# Patient Record
Sex: MALE | Race: WHITE | NOT HISPANIC OR LATINO
[De-identification: names, ages, dates, MRNs, and addresses within clinical notes are randomized per-mention and may not be internally consistent; named-entity substitution may affect disease eponyms.]

---

## 2017-08-07 PROBLEM — Z00.00 ENCOUNTER FOR PREVENTIVE HEALTH EXAMINATION: Status: ACTIVE | Noted: 2017-08-07

## 2017-08-29 ENCOUNTER — RECORD ABSTRACTING (OUTPATIENT)
Age: 82
End: 2017-08-29

## 2017-08-29 DIAGNOSIS — E78.5 HYPERLIPIDEMIA, UNSPECIFIED: ICD-10-CM

## 2017-08-29 DIAGNOSIS — I48.91 UNSPECIFIED ATRIAL FIBRILLATION: ICD-10-CM

## 2017-08-29 DIAGNOSIS — Z92.89 PERSONAL HISTORY OF OTHER MEDICAL TREATMENT: ICD-10-CM

## 2017-08-29 RX ORDER — WARFARIN SODIUM 5 MG/1
5 TABLET ORAL
Refills: 0 | Status: ACTIVE | COMMUNITY

## 2017-08-29 RX ORDER — ATORVASTATIN CALCIUM 40 MG/1
40 TABLET, FILM COATED ORAL
Refills: 0 | Status: ACTIVE | COMMUNITY

## 2017-08-29 RX ORDER — ASPIRIN 81 MG/1
81 TABLET ORAL
Refills: 0 | Status: ACTIVE | COMMUNITY

## 2017-08-29 RX ORDER — SIMVASTATIN 10 MG/1
10 TABLET, FILM COATED ORAL DAILY
Refills: 0 | Status: ACTIVE | COMMUNITY

## 2017-08-29 RX ORDER — ASPIRIN 81 MG
81 TABLET, DELAYED RELEASE (ENTERIC COATED) ORAL
Refills: 0 | Status: ACTIVE | COMMUNITY

## 2017-08-29 RX ORDER — ISOSORBIDE MONONITRATE 30 MG
30 TABLET, EXTENDED RELEASE 24 HR ORAL
Refills: 0 | Status: ACTIVE | COMMUNITY

## 2017-08-29 RX ORDER — ISOSORBIDE MONONITRATE 60 MG/1
60 TABLET, EXTENDED RELEASE ORAL
Refills: 0 | Status: ACTIVE | COMMUNITY

## 2017-08-29 RX ORDER — METOPROLOL SUCCINATE 50 MG/1
50 TABLET, EXTENDED RELEASE ORAL
Refills: 0 | Status: ACTIVE | COMMUNITY

## 2017-09-12 ENCOUNTER — APPOINTMENT (OUTPATIENT)
Dept: NEUROLOGY | Facility: CLINIC | Age: 82
End: 2017-09-12
Payer: MEDICARE

## 2017-09-12 VITALS
BODY MASS INDEX: 24.11 KG/M2 | DIASTOLIC BLOOD PRESSURE: 64 MMHG | WEIGHT: 150 LBS | HEIGHT: 66 IN | SYSTOLIC BLOOD PRESSURE: 123 MMHG

## 2017-09-12 PROCEDURE — 99213 OFFICE O/P EST LOW 20 MIN: CPT

## 2017-12-12 ENCOUNTER — APPOINTMENT (OUTPATIENT)
Dept: NEUROLOGY | Facility: CLINIC | Age: 82
End: 2017-12-12
Payer: MEDICARE

## 2017-12-12 VITALS
HEIGHT: 66 IN | SYSTOLIC BLOOD PRESSURE: 110 MMHG | DIASTOLIC BLOOD PRESSURE: 70 MMHG | WEIGHT: 150 LBS | BODY MASS INDEX: 24.11 KG/M2

## 2017-12-12 PROCEDURE — 99214 OFFICE O/P EST MOD 30 MIN: CPT

## 2017-12-12 RX ORDER — RASAGILINE MESYLATE 1 MG/1
1 TABLET ORAL DAILY
Refills: 0 | Status: COMPLETED | COMMUNITY
End: 2017-12-12

## 2018-03-26 ENCOUNTER — APPOINTMENT (OUTPATIENT)
Dept: NEUROLOGY | Facility: CLINIC | Age: 83
End: 2018-03-26
Payer: MEDICARE

## 2018-03-26 VITALS
SYSTOLIC BLOOD PRESSURE: 115 MMHG | WEIGHT: 150 LBS | BODY MASS INDEX: 24.11 KG/M2 | HEIGHT: 66 IN | DIASTOLIC BLOOD PRESSURE: 63 MMHG

## 2018-03-26 PROCEDURE — 99214 OFFICE O/P EST MOD 30 MIN: CPT

## 2018-06-25 ENCOUNTER — APPOINTMENT (OUTPATIENT)
Dept: NEUROLOGY | Facility: CLINIC | Age: 83
End: 2018-06-25
Payer: MEDICARE

## 2018-06-25 VITALS
BODY MASS INDEX: 24.11 KG/M2 | SYSTOLIC BLOOD PRESSURE: 130 MMHG | WEIGHT: 150 LBS | DIASTOLIC BLOOD PRESSURE: 80 MMHG | HEIGHT: 66 IN

## 2018-06-25 PROCEDURE — 99214 OFFICE O/P EST MOD 30 MIN: CPT

## 2018-10-22 ENCOUNTER — APPOINTMENT (OUTPATIENT)
Dept: NEUROLOGY | Facility: CLINIC | Age: 83
End: 2018-10-22
Payer: MEDICARE

## 2018-10-22 VITALS
DIASTOLIC BLOOD PRESSURE: 68 MMHG | SYSTOLIC BLOOD PRESSURE: 118 MMHG | WEIGHT: 150 LBS | BODY MASS INDEX: 24.11 KG/M2 | HEIGHT: 66 IN

## 2018-10-22 PROCEDURE — 99214 OFFICE O/P EST MOD 30 MIN: CPT

## 2019-02-25 ENCOUNTER — APPOINTMENT (OUTPATIENT)
Dept: NEUROLOGY | Facility: CLINIC | Age: 84
End: 2019-02-25
Payer: MEDICARE

## 2019-02-25 VITALS
DIASTOLIC BLOOD PRESSURE: 68 MMHG | SYSTOLIC BLOOD PRESSURE: 108 MMHG | WEIGHT: 150 LBS | HEIGHT: 66 IN | BODY MASS INDEX: 24.11 KG/M2

## 2019-02-25 PROCEDURE — 99214 OFFICE O/P EST MOD 30 MIN: CPT

## 2019-02-25 NOTE — CONSULT LETTER
[Dear  ___] : Dear  [unfilled], [Courtesy Letter:] : I had the pleasure of seeing your patient, [unfilled], in my office today. [Please see my note below.] : Please see my note below. [Consult Closing:] : Thank you very much for allowing me to participate in the care of this patient.  If you have any questions, please do not hesitate to contact me. [Sincerely,] : Sincerely, [FreeTextEntry3] : Bolivar Mathis M.D., Ph.D. DPN-N\par [facility]\par Neurology at [place]\par Medical Director of Stroke Services\par [facility]\par

## 2019-02-25 NOTE — PHYSICAL EXAM
[Person] : oriented to person [Place] : oriented to place [Time] : oriented to time [Remote Intact] : remote memory intact [Registration Intact] : recent registration memory intact [Span Intact] : the attention span was normal [Concentration Intact] : normal concentrating ability [Visual Intact] : visual attention was ~T not ~L decreased [Naming Objects] : no difficulty naming common objects [Repeating Phrases] : no difficulty repeating a phrase [Fluency] : fluency intact [Comprehension] : comprehension intact [Current Events] : adequate knowledge of current events [Past History] : adequate knowledge of personal past history [Cranial Nerves Optic (II)] : visual acuity intact bilaterally,  visual fields full to confrontation, pupils equal round and reactive to light [Cranial Nerves Oculomotor (III)] : extraocular motion intact [Cranial Nerves Trigeminal (V)] : facial sensation intact symmetrically [Cranial Nerves Facial (VII)] : face symmetrical [Cranial Nerves Vestibulocochlear (VIII)] : hearing was intact bilaterally [Cranial Nerves Glossopharyngeal (IX)] : tongue and palate midline [Cranial Nerves Accessory (XI - Cranial And Spinal)] : head turning and shoulder shrug symmetric [Cranial Nerves Hypoglossal (XII)] : there was no tongue deviation with protrusion [No Muscle Atrophy] : normal bulk in all four extremities [Paresis Pronator Drift Right-Sided] : no pronator drift on the right [Paresis Pronator Drift Left-Sided] : no pronator drift on the left [Sensation Tactile Decrease] : light touch was intact [Sensation Pain / Temperature Decrease] : pain and temperature was intact [Sensation Vibration Decrease] : vibration was intact [Proprioception] : proprioception was intact [Tremor] : a tremor present [Coordination - Dysmetria Impaired Finger-to-Nose Bilateral] : not present [1+] : Patella left 1+ [Plantar Reflex Right Only] : normal on the right [Plantar Reflex Left Only] : normal on the left [FreeTextEntry6] : Increased tone bilateral, diffuse weakness [FreeTextEntry8] : using walker, stooped posture, shuffling gait [Sclera] : the sclera and conjunctiva were normal [PERRL With Normal Accommodation] : pupils were equal in size, round, reactive to light, with normal accommodation [Extraocular Movements] : extraocular movements were intact [No APD] : no afferent pupillary defect [No DELFINA] : no internuclear ophthalmoplegia [Full Visual Field] : full visual field

## 2019-02-25 NOTE — ASSESSMENT
[FreeTextEntry1] : This is an 88-year-old man who follows up today with Parkinson's disease as well as seizure disorder.\par \par Parkinson's disease: he should continue Sinemet 25/100 mg tablets one tablet 4 times a day, entacapone 100 mg 3 times a day, ropinirole 1 mg twice a day.\par \par Seizure disorder: He should continue carbamazepine 200 mg twice a day.\par \par Back pain: this is likely musculoskeletal. I offered physical therapy but he declined. A suggested pain management which he didn't past not find helpful. He I also advised heating packs.\par \par I will see him back in 6 months, sooner should the need arise. I have asked him to call me in the interim with any problems, questions or concerns.\par

## 2019-02-25 NOTE — HISTORY OF PRESENT ILLNESS
[FreeTextEntry1] : Followup visit September 12, 2017:\par This is an 86-year-old gentleman with idiopathic Parkinson's disease returns today for followup. He is maintained on Sinemet 25/100 mg tablets 4 times a day in epidural. He had been on as left but stopped when we had to restart his Tegretol. Since stopping the azilect he suffered a decline. He feels that he is more stopping and starting/on-off episodes. Regarding his seizures he has not had a recurrent seizure since being restarted on Tegretol. He is here today for neurologic followup.\par \par Followup December 12, 2017:\par This is an 86-year-old donor dressing for Parkinson's disease. He continues to take Sinemet 25/100 mg tablets 4 times a day. We had started him on Comtan he was getting too tired from it any tapered down to 1-1/2 tablets per day. At this dose he is able tolerate it. He is on Tegretol for seizures and doing stable. He is overall stable since his last visit in September.\par \par Followup March 26, 2018:\par This is an 87-year-old man who returns today for followup of idiopathic Parkinson's disease and seizure disorder. He is here with his wife who states that he's had some mild decline since being seen 3 months ago. He is taking Sinemet 4 times a day, ropinirole twice a day and entacapone twice a day for his Parkinson's disease. He is also taking Tegretol for seizure. He here today for neurologic followup.\par \par Followup June 25, 2018:\par This is an 87-year-old man who returns today for followup. He has idiopathic Parkinson's disease as well as seizure disorder. He is maintained at on Sinemet 25/100 mg tablets one tablet 4 times a day, entacapone 200 mg tablets one tablet in the morning and half a tablet in the evening and ropinirole twice daily. His seizures  are maintained on carbamazepine. He is taking his medications without side effects and with good compliance. He is shuffling a bit more and has increased tremor but is otherwise stable. He has not had any seizures. He is here today with his wife routine neurologic followup.\par \par Followup October 22, 2018:\par This is an 87-year-old man who presents today for followup of Parkinson's disease and seizure disorder. He is accompanied by his wife. For his Parkinson's disease he is currently taking Sinemet 4 times a day along with Comtan 200 Mg in the Morning and 100 Mg at Night and Requip twice daily. He is noting increasing amount of off time. He complains that he is stiff when walking. Regarding his seizure she is currently on carbamazepine and has not had breakthrough seizure. He has excellent compliance with all of his medications. He is here today for neurologic followup.\par \par Followup February 25, 2019:\par This is an 88-year-old man who presents today for followup of Parkinson's disease and seizure disorder. He is currently taking Sinemet 25/100 mg tablets one tablet 4 times a day, Comtan 100 mg 3 times a day, ropinirole 1 mg twice a day, and carbamazepine 200 mg twice a day. He has suffered functional decline since his last visit. He is also having a significant amount of back pain and he has a hunched over posture. He's experiencing pain in his legs too. Regarding his Parkinson's he continues to complain of on-off episodes and freezing. She had been at [facility] in the past but was not able to tolerate it. He is here today for neurologic followup.

## 2019-08-26 ENCOUNTER — APPOINTMENT (OUTPATIENT)
Dept: NEUROLOGY | Facility: CLINIC | Age: 84
End: 2019-08-26
Payer: MEDICARE

## 2019-08-26 VITALS
SYSTOLIC BLOOD PRESSURE: 115 MMHG | HEIGHT: 66 IN | BODY MASS INDEX: 24.11 KG/M2 | WEIGHT: 150 LBS | DIASTOLIC BLOOD PRESSURE: 80 MMHG

## 2019-08-26 PROCEDURE — 99213 OFFICE O/P EST LOW 20 MIN: CPT

## 2019-08-26 NOTE — CONSULT LETTER
[Courtesy Letter:] : I had the pleasure of seeing your patient, [unfilled], in my office today. [Dear  ___] : Dear  [unfilled], [Please see my note below.] : Please see my note below. [Consult Closing:] : Thank you very much for allowing me to participate in the care of this patient.  If you have any questions, please do not hesitate to contact me. [Sincerely,] : Sincerely, [FreeTextEntry3] : Bolivar Mathis M.D., Ph.D. DPN-N\par [facility]\par Neurology at [place]\par Medical Director of Stroke Services\par [facility]\par

## 2019-08-26 NOTE — HISTORY OF PRESENT ILLNESS
[FreeTextEntry1] : Followup visit September 12, 2017:\par This is an 86-year-old gentleman with idiopathic Parkinson's disease returns today for followup. He is maintained on Sinemet 25/100 mg tablets 4 times a day in epidural. He had been on as left but stopped when we had to restart his Tegretol. Since stopping the azilect he suffered a decline. He feels that he is more stopping and starting/on-off episodes. Regarding his seizures he has not had a recurrent seizure since being restarted on Tegretol. He is here today for neurologic followup.\par \par Followup December 12, 2017:\par This is an 86-year-old donor dressing for Parkinson's disease. He continues to take Sinemet 25/100 mg tablets 4 times a day. We had started him on Comtan he was getting too tired from it any tapered down to 1-1/2 tablets per day. At this dose he is able tolerate it. He is on Tegretol for seizures and doing stable. He is overall stable since his last visit in September.\par \par Followup March 26, 2018:\par This is an 87-year-old man who returns today for followup of idiopathic Parkinson's disease and seizure disorder. He is here with his wife who states that he's had some mild decline since being seen 3 months ago. He is taking Sinemet 4 times a day, ropinirole twice a day and entacapone twice a day for his Parkinson's disease. He is also taking Tegretol for seizure. He here today for neurologic followup.\par \par Followup June 25, 2018:\par This is an 87-year-old man who returns today for followup. He has idiopathic Parkinson's disease as well as seizure disorder. He is maintained at on Sinemet 25/100 mg tablets one tablet 4 times a day, entacapone 200 mg tablets one tablet in the morning and half a tablet in the evening and ropinirole twice daily. His seizures  are maintained on carbamazepine. He is taking his medications without side effects and with good compliance. He is shuffling a bit more and has increased tremor but is otherwise stable. He has not had any seizures. He is here today with his wife routine neurologic followup.\par \par Followup October 22, 2018:\par This is an 87-year-old man who presents today for followup of Parkinson's disease and seizure disorder. He is accompanied by his wife. For his Parkinson's disease he is currently taking Sinemet 4 times a day along with Comtan 200 Mg in the Morning and 100 Mg at Night and Requip twice daily. He is noting increasing amount of off time. He complains that he is stiff when walking. Regarding his seizure she is currently on carbamazepine and has not had breakthrough seizure. He has excellent compliance with all of his medications. He is here today for neurologic followup.\par \par Followup February 25, 2019:\par This is an 88-year-old man who presents today for followup of Parkinson's disease and seizure disorder. He is currently taking Sinemet 25/100 mg tablets one tablet 4 times a day, Comtan 100 mg 3 times a day, ropinirole 1 mg twice a day, and carbamazepine 200 mg twice a day. He has suffered functional decline since his last visit. He is also having a significant amount of back pain and he has a hunched over posture. He's experiencing pain in his legs too. Regarding his Parkinson's he continues to complain of on-off episodes and freezing. She had been [facility] in the past but was not able to tolerate it. He is here today for neurologic followup.\par \par Followup August 26, 2019:\par This is a 88-year-old man who presents today for followup of Parkinson's disease and seizure. Since his last visit he was hospitalized with what sounds like a GI bleed. He went for rehabilitation following his hospitalization and had home physical therapy as well. His wife states that he is not back to his baseline prior to admission. His legs appear to be weaker and having more trouble walking. He continues to have parkinsonian tremor. He's not had any seizures. For his Parkinson's disease he remains on Sinemet, entacapone, and ropinirole. For his seizure disorder he remains on carbamazepine. He is here today for neurologic followup.

## 2019-08-26 NOTE — PHYSICAL EXAM
[Person] : oriented to person [Time] : oriented to time [Place] : oriented to place [Registration Intact] : recent registration memory intact [Remote Intact] : remote memory intact [Concentration Intact] : normal concentrating ability [Span Intact] : the attention span was normal [Naming Objects] : no difficulty naming common objects [Visual Intact] : visual attention was ~T not ~L decreased [Repeating Phrases] : no difficulty repeating a phrase [Fluency] : fluency intact [Comprehension] : comprehension intact [Past History] : adequate knowledge of personal past history [Current Events] : adequate knowledge of current events [Cranial Nerves Optic (II)] : visual acuity intact bilaterally,  visual fields full to confrontation, pupils equal round and reactive to light [Cranial Nerves Oculomotor (III)] : extraocular motion intact [Cranial Nerves Facial (VII)] : face symmetrical [Cranial Nerves Trigeminal (V)] : facial sensation intact symmetrically [Cranial Nerves Glossopharyngeal (IX)] : tongue and palate midline [Cranial Nerves Vestibulocochlear (VIII)] : hearing was intact bilaterally [Cranial Nerves Accessory (XI - Cranial And Spinal)] : head turning and shoulder shrug symmetric [Cranial Nerves Hypoglossal (XII)] : there was no tongue deviation with protrusion [No Muscle Atrophy] : normal bulk in all four extremities [Paresis Pronator Drift Left-Sided] : no pronator drift on the left [Paresis Pronator Drift Right-Sided] : no pronator drift on the right [Sensation Tactile Decrease] : light touch was intact [Sensation Vibration Decrease] : vibration was intact [Sensation Pain / Temperature Decrease] : pain and temperature was intact [Proprioception] : proprioception was intact [Tremor] : a tremor present [Coordination - Dysmetria Impaired Finger-to-Nose Bilateral] : not present [1+] : Patella left 1+ [Plantar Reflex Right Only] : normal on the right [Plantar Reflex Left Only] : normal on the left [FreeTextEntry6] : Increased tone bilateral, diffuse weakness [FreeTextEntry8] : using walker, stooped posture, shuffling gait [Sclera] : the sclera and conjunctiva were normal [Extraocular Movements] : extraocular movements were intact [PERRL With Normal Accommodation] : pupils were equal in size, round, reactive to light, with normal accommodation [No APD] : no afferent pupillary defect [No DELFINA] : no internuclear ophthalmoplegia [Full Visual Field] : full visual field

## 2019-08-26 NOTE — ASSESSMENT
[FreeTextEntry1] : This is a 88-year-old man with a seizure disorder Parkinson's disease.\par \par Seizure disorder: Continue carbamazepine 200 mg twice daily.\par \par Parkinson's disease: Continue Sinemet, entacapone and repair all. I offered physical therapy to continue at home to help with his gait which he declined.\par \par I will see him back in the office in 6 months, sooner should any arise. I have asked his wife to call me in the interim should she have any problems questions or concerns before his next appointment.

## 2020-02-24 ENCOUNTER — APPOINTMENT (OUTPATIENT)
Dept: NEUROLOGY | Facility: CLINIC | Age: 85
End: 2020-02-24
Payer: MEDICARE

## 2020-02-24 VITALS
DIASTOLIC BLOOD PRESSURE: 70 MMHG | HEIGHT: 66 IN | WEIGHT: 145 LBS | SYSTOLIC BLOOD PRESSURE: 118 MMHG | BODY MASS INDEX: 23.3 KG/M2

## 2020-02-24 DIAGNOSIS — R56.9 UNSPECIFIED CONVULSIONS: ICD-10-CM

## 2020-02-24 DIAGNOSIS — G20 PARKINSON'S DISEASE: ICD-10-CM

## 2020-02-24 PROCEDURE — 99213 OFFICE O/P EST LOW 20 MIN: CPT

## 2020-02-24 RX ORDER — CARBAMAZEPINE 200 MG/1
200 TABLET ORAL
Refills: 0 | Status: ACTIVE | COMMUNITY

## 2020-02-24 RX ORDER — ENTACAPONE 200 MG/1
200 TABLET, FILM COATED ORAL 3 TIMES DAILY
Qty: 90 | Refills: 1 | Status: ACTIVE | COMMUNITY
Start: 2017-09-12

## 2020-02-24 RX ORDER — CARBIDOPA AND LEVODOPA 25; 100 MG/1; MG/1
25-100 TABLET ORAL
Refills: 0 | Status: ACTIVE | COMMUNITY

## 2020-02-24 NOTE — ASSESSMENT
[FreeTextEntry1] : This is a 89-year-old man with Parkinson's disease. He is progressing and having more difficulty walking. He continues to go for physical therapy. I have asked his wife to increase his ropinirole to 2 mg twice a day. The past and has been decreased to 1 mg twice a day secondary to hallucination. If he has more hallucinations on higher dose he'll go back down to 1 mg twice a day. If he does agree to physical therapy as always it offered to write him a prescription for it.

## 2020-02-24 NOTE — PHYSICAL EXAM
[Person] : oriented to person [Place] : oriented to place [Remote Intact] : remote memory intact [Time] : oriented to time [Registration Intact] : recent registration memory intact [Span Intact] : the attention span was normal [Visual Intact] : visual attention was ~T not ~L decreased [Concentration Intact] : normal concentrating ability [Naming Objects] : no difficulty naming common objects [Fluency] : fluency intact [Repeating Phrases] : no difficulty repeating a phrase [Comprehension] : comprehension intact [Current Events] : adequate knowledge of current events [Past History] : adequate knowledge of personal past history [Cranial Nerves Optic (II)] : visual acuity intact bilaterally,  visual fields full to confrontation, pupils equal round and reactive to light [Cranial Nerves Oculomotor (III)] : extraocular motion intact [Cranial Nerves Trigeminal (V)] : facial sensation intact symmetrically [Cranial Nerves Vestibulocochlear (VIII)] : hearing was intact bilaterally [Cranial Nerves Facial (VII)] : face symmetrical [Cranial Nerves Accessory (XI - Cranial And Spinal)] : head turning and shoulder shrug symmetric [Cranial Nerves Glossopharyngeal (IX)] : tongue and palate midline [Cranial Nerves Hypoglossal (XII)] : there was no tongue deviation with protrusion [No Muscle Atrophy] : normal bulk in all four extremities [Paresis Pronator Drift Right-Sided] : no pronator drift on the right [Paresis Pronator Drift Left-Sided] : no pronator drift on the left [Sensation Pain / Temperature Decrease] : pain and temperature was intact [Sensation Tactile Decrease] : light touch was intact [Tremor] : a tremor present [Sensation Vibration Decrease] : vibration was intact [Proprioception] : proprioception was intact [Coordination - Dysmetria Impaired Finger-to-Nose Bilateral] : not present [1+] : Patella left 1+ [Plantar Reflex Right Only] : normal on the right [Plantar Reflex Left Only] : normal on the left [FreeTextEntry6] : Increased tone bilateral, diffuse weakness [FreeTextEntry8] : using Rolling walker, stooped posture, shuffling gait [Sclera] : the sclera and conjunctiva were normal [No APD] : no afferent pupillary defect [PERRL With Normal Accommodation] : pupils were equal in size, round, reactive to light, with normal accommodation [Extraocular Movements] : extraocular movements were intact [No DELFINA] : no internuclear ophthalmoplegia [Full Visual Field] : full visual field

## 2020-02-24 NOTE — CONSULT LETTER
[Dear  ___] : Dear  [unfilled], [Consult Closing:] : Thank you very much for allowing me to participate in the care of this patient.  If you have any questions, please do not hesitate to contact me. [Please see my note below.] : Please see my note below. [Courtesy Letter:] : I had the pleasure of seeing your patient, [unfilled], in my office today. [Sincerely,] : Sincerely, [FreeTextEntry3] : Bolivar Mathis M.D., Ph.D. DPN-N\par [facility]\par Neurology at [place]\par Medical Director of Stroke Services\par [facility]\par

## 2020-02-24 NOTE — HISTORY OF PRESENT ILLNESS
[FreeTextEntry1] : Followup visit September 12, 2017:\par This is an 86-year-old gentleman with idiopathic Parkinson's disease returns today for followup. He is maintained on Sinemet 25/100 mg tablets 4 times a day in epidural. He had been on as left but stopped when we had to restart his Tegretol. Since stopping the azilect he suffered a decline. He feels that he is more stopping and starting/on-off episodes. Regarding his seizures he has not had a recurrent seizure since being restarted on Tegretol. He is here today for neurologic followup.\par \par Followup December 12, 2017:\par This is an 86-year-old donor dressing for Parkinson's disease. He continues to take Sinemet 25/100 mg tablets 4 times a day. We had started him on Comtan he was getting too tired from it any tapered down to 1-1/2 tablets per day. At this dose he is able tolerate it. He is on Tegretol for seizures and doing stable. He is overall stable since his last visit in September.\par \par Followup March 26, 2018:\par This is an 87-year-old man who returns today for followup of idiopathic Parkinson's disease and seizure disorder. He is here with his wife who states that he's had some mild decline since being seen 3 months ago. He is taking Sinemet 4 times a day, ropinirole twice a day and entacapone twice a day for his Parkinson's disease. He is also taking Tegretol for seizure. He here today for neurologic followup.\par \par Followup June 25, 2018:\par This is an 87-year-old man who returns today for followup. He has idiopathic Parkinson's disease as well as seizure disorder. He is maintained at on Sinemet 25/100 mg tablets one tablet 4 times a day, entacapone 200 mg tablets one tablet in the morning and half a tablet in the evening and ropinirole twice daily. His seizures  are maintained on carbamazepine. He is taking his medications without side effects and with good compliance. He is shuffling a bit more and has increased tremor but is otherwise stable. He has not had any seizures. He is here today with his wife routine neurologic followup.\par \par Followup October 22, 2018:\par This is an 87-year-old man who presents today for followup of Parkinson's disease and seizure disorder. He is accompanied by his wife. For his Parkinson's disease he is currently taking Sinemet 4 times a day along with Comtan 200 Mg in the Morning and 100 Mg at Night and Requip twice daily. He is noting increasing amount of off time. He complains that he is stiff when walking. Regarding his seizure she is currently on carbamazepine and has not had breakthrough seizure. He has excellent compliance with all of his medications. He is here today for neurologic followup.\par \par Followup February 25, 2019:\par This is an 88-year-old man who presents today for followup of Parkinson's disease and seizure disorder. He is currently taking Sinemet 25/100 mg tablets one tablet 4 times a day, Comtan 100 mg 3 times a day, ropinirole 1 mg twice a day, and carbamazepine 200 mg twice a day. He has suffered functional decline since his last visit. He is also having a significant amount of back pain and he has a hunched over posture. He's experiencing pain in his legs too. Regarding his Parkinson's he continues to complain of on-off episodes and freezing. She had been at [facility] in [facility] past but was not able to tolerate it. He is here today for neurologic followup.\par \par Followup August 26, 2019:\par This is a 88-year-old man who presents today for followup of Parkinson's disease and seizure. Since his last visit he was hospitalized with what sounds like a GI bleed. He went for rehabilitation following his hospitalization and had home physical therapy as well. His wife states that he is not back to his baseline prior to admission. His legs appear to be weaker and having more trouble walking. He continues to have parkinsonian tremor. He's not had any seizures. For his Parkinson's disease he remains on Sinemet, entacapone, and ropinirole. For his seizure disorder he remains on carbamazepine. He is here today for neurologic followup.\par \par Followup February 24th 2020:\par This is an 89-year-old man he presents today for followup of Parkinson's disease and seizure. Since his last visit his gait has worsened significantly. He is having trouble walking with a walker at times. He is experiencing pain in his legs. His wife also states that he sweating at night. He is here today for neurologic followup.

## 2020-03-06 RX ORDER — ROPINIROLE 2 MG/1
2 TABLET, FILM COATED ORAL TWICE DAILY
Qty: 180 | Refills: 3 | Status: ACTIVE | COMMUNITY
Start: 2020-03-06 | End: 1900-01-01

## 2020-06-01 ENCOUNTER — APPOINTMENT (OUTPATIENT)
Dept: NEUROLOGY | Facility: CLINIC | Age: 85
End: 2020-06-01

## 2020-06-05 RX ORDER — ROPINIROLE 1 MG/1
1 TABLET, FILM COATED ORAL
Qty: 180 | Refills: 1 | Status: ACTIVE | COMMUNITY
Start: 1900-01-01 | End: 1900-01-01

## 2022-01-01 ENCOUNTER — NON-APPOINTMENT (OUTPATIENT)
Age: 87
End: 2022-01-01